# Patient Record
Sex: MALE | Race: BLACK OR AFRICAN AMERICAN | Employment: OTHER | URBAN - METROPOLITAN AREA
[De-identification: names, ages, dates, MRNs, and addresses within clinical notes are randomized per-mention and may not be internally consistent; named-entity substitution may affect disease eponyms.]

---

## 2021-01-01 ENCOUNTER — HOSPICE ADMISSION (OUTPATIENT)
Dept: HOSPICE | Facility: HOSPICE | Age: 76
End: 2021-01-01
Payer: MEDICARE

## 2021-01-01 ENCOUNTER — HOSPITAL ENCOUNTER (INPATIENT)
Age: 76
LOS: 2 days | End: 2021-09-03
Attending: INTERNAL MEDICINE | Admitting: INTERNAL MEDICINE

## 2021-01-01 VITALS
SYSTOLIC BLOOD PRESSURE: 120 MMHG | RESPIRATION RATE: 17 BRPM | TEMPERATURE: 97.4 F | HEART RATE: 50 BPM | DIASTOLIC BLOOD PRESSURE: 70 MMHG

## 2021-01-01 PROCEDURE — 74011250636 HC RX REV CODE- 250/636: Performed by: INTERNAL MEDICINE

## 2021-01-01 PROCEDURE — 0656 HSPC GENERAL INPATIENT

## 2021-01-01 PROCEDURE — 3336500001 HSPC ELECTION

## 2021-01-01 PROCEDURE — 74011250637 HC RX REV CODE- 250/637: Performed by: INTERNAL MEDICINE

## 2021-01-01 PROCEDURE — 74011000250 HC RX REV CODE- 250: Performed by: NURSE PRACTITIONER

## 2021-01-01 PROCEDURE — 74011250636 HC RX REV CODE- 250/636: Performed by: NURSE PRACTITIONER

## 2021-01-01 PROCEDURE — G0299 HHS/HOSPICE OF RN EA 15 MIN: HCPCS

## 2021-01-01 RX ORDER — ACETAMINOPHEN 650 MG/1
650 SUPPOSITORY RECTAL
Status: DISCONTINUED | OUTPATIENT
Start: 2021-01-01 | End: 2021-01-01 | Stop reason: HOSPADM

## 2021-01-01 RX ORDER — SODIUM CHLORIDE 0.9 % (FLUSH) 0.9 %
3 SYRINGE (ML) INJECTION AS NEEDED
Status: DISCONTINUED | OUTPATIENT
Start: 2021-01-01 | End: 2021-01-01 | Stop reason: HOSPADM

## 2021-01-01 RX ORDER — HYDROMORPHONE HYDROCHLORIDE 1 MG/ML
0.5 INJECTION, SOLUTION INTRAMUSCULAR; INTRAVENOUS; SUBCUTANEOUS
Status: DISCONTINUED | OUTPATIENT
Start: 2021-01-01 | End: 2021-01-01 | Stop reason: HOSPADM

## 2021-01-01 RX ORDER — FENTANYL 25 UG/1
1 PATCH TRANSDERMAL
Status: DISCONTINUED | OUTPATIENT
Start: 2021-01-01 | End: 2021-01-01 | Stop reason: HOSPADM

## 2021-01-01 RX ORDER — HALOPERIDOL 5 MG/ML
2 INJECTION INTRAMUSCULAR
Status: DISCONTINUED | OUTPATIENT
Start: 2021-01-01 | End: 2021-01-01 | Stop reason: HOSPADM

## 2021-01-01 RX ORDER — DEXAMETHASONE SODIUM PHOSPHATE 4 MG/ML
2 INJECTION, SOLUTION INTRA-ARTICULAR; INTRALESIONAL; INTRAMUSCULAR; INTRAVENOUS; SOFT TISSUE EVERY 12 HOURS
Status: DISCONTINUED | OUTPATIENT
Start: 2021-01-01 | End: 2021-01-01 | Stop reason: HOSPADM

## 2021-01-01 RX ORDER — SODIUM CHLORIDE 0.9 % (FLUSH) 0.9 %
3 SYRINGE (ML) INJECTION EVERY 12 HOURS
Status: DISCONTINUED | OUTPATIENT
Start: 2021-01-01 | End: 2021-01-01 | Stop reason: HOSPADM

## 2021-01-01 RX ORDER — GLYCOPYRROLATE 0.2 MG/ML
0.2 INJECTION INTRAMUSCULAR; INTRAVENOUS
Status: DISCONTINUED | OUTPATIENT
Start: 2021-01-01 | End: 2021-01-01 | Stop reason: HOSPADM

## 2021-01-01 RX ADMIN — HALOPERIDOL LACTATE 2 MG: 5 INJECTION, SOLUTION INTRAMUSCULAR at 00:44

## 2021-01-01 RX ADMIN — HYDROMORPHONE HYDROCHLORIDE 0.5 MG: 1 INJECTION, SOLUTION INTRAMUSCULAR; INTRAVENOUS; SUBCUTANEOUS at 12:23

## 2021-01-01 RX ADMIN — HALOPERIDOL LACTATE 2 MG: 5 INJECTION, SOLUTION INTRAMUSCULAR at 19:43

## 2021-01-01 RX ADMIN — HYDROMORPHONE HYDROCHLORIDE 0.5 MG: 1 INJECTION, SOLUTION INTRAMUSCULAR; INTRAVENOUS; SUBCUTANEOUS at 10:08

## 2021-01-01 RX ADMIN — HYDROMORPHONE HYDROCHLORIDE 0.5 MG: 1 INJECTION, SOLUTION INTRAMUSCULAR; INTRAVENOUS; SUBCUTANEOUS at 02:33

## 2021-01-01 RX ADMIN — HALOPERIDOL LACTATE 2 MG: 5 INJECTION, SOLUTION INTRAMUSCULAR at 19:10

## 2021-01-01 RX ADMIN — HALOPERIDOL LACTATE 2 MG: 5 INJECTION, SOLUTION INTRAMUSCULAR at 10:08

## 2021-01-01 RX ADMIN — Medication 3 ML: at 09:31

## 2021-01-01 RX ADMIN — SODIUM CHLORIDE, PRESERVATIVE FREE 3 ML: 5 INJECTION INTRAVENOUS at 19:11

## 2021-01-01 RX ADMIN — HYDROMORPHONE HYDROCHLORIDE 0.5 MG: 1 INJECTION, SOLUTION INTRAMUSCULAR; INTRAVENOUS; SUBCUTANEOUS at 01:06

## 2021-01-01 RX ADMIN — DEXAMETHASONE SODIUM PHOSPHATE 2 MG: 4 INJECTION, SOLUTION INTRAMUSCULAR; INTRAVENOUS at 20:09

## 2021-01-01 RX ADMIN — HYDROMORPHONE HYDROCHLORIDE 0.5 MG: 1 INJECTION, SOLUTION INTRAMUSCULAR; INTRAVENOUS; SUBCUTANEOUS at 19:43

## 2021-01-01 RX ADMIN — Medication 3 ML: at 10:09

## 2021-01-01 RX ADMIN — HYDROMORPHONE HYDROCHLORIDE 0.5 MG: 1 INJECTION, SOLUTION INTRAMUSCULAR; INTRAVENOUS; SUBCUTANEOUS at 00:45

## 2021-01-01 RX ADMIN — DEXAMETHASONE SODIUM PHOSPHATE 2 MG: 4 INJECTION, SOLUTION INTRAMUSCULAR; INTRAVENOUS at 10:09

## 2021-01-01 RX ADMIN — HALOPERIDOL LACTATE 2 MG: 5 INJECTION, SOLUTION INTRAMUSCULAR at 15:46

## 2021-01-01 RX ADMIN — HALOPERIDOL LACTATE 2 MG: 5 INJECTION, SOLUTION INTRAMUSCULAR at 12:24

## 2021-01-01 RX ADMIN — HALOPERIDOL LACTATE 2 MG: 5 INJECTION, SOLUTION INTRAMUSCULAR at 02:33

## 2021-01-01 RX ADMIN — HYDROMORPHONE HYDROCHLORIDE 0.5 MG: 1 INJECTION, SOLUTION INTRAMUSCULAR; INTRAVENOUS; SUBCUTANEOUS at 15:46

## 2021-01-01 RX ADMIN — SODIUM CHLORIDE, PRESERVATIVE FREE 3 ML: 5 INJECTION INTRAVENOUS at 15:47

## 2021-01-01 RX ADMIN — Medication 3 ML: at 21:00

## 2021-01-01 RX ADMIN — Medication 3 ML: at 20:09

## 2021-01-01 RX ADMIN — SODIUM CHLORIDE, PRESERVATIVE FREE 3 ML: 5 INJECTION INTRAVENOUS at 12:26

## 2021-01-01 RX ADMIN — HYDROMORPHONE HYDROCHLORIDE 0.5 MG: 1 INJECTION, SOLUTION INTRAMUSCULAR; INTRAVENOUS; SUBCUTANEOUS at 19:10

## 2021-01-01 RX ADMIN — HYDROMORPHONE HYDROCHLORIDE 0.5 MG: 1 INJECTION, SOLUTION INTRAMUSCULAR; INTRAVENOUS; SUBCUTANEOUS at 23:07

## 2021-09-01 PROBLEM — N18.6 ESRD (END STAGE RENAL DISEASE) (HCC): Status: ACTIVE | Noted: 2021-01-01

## 2021-09-01 PROBLEM — Z51.5 HOSPICE CARE PATIENT: Status: ACTIVE | Noted: 2021-01-01

## 2021-09-01 NOTE — PROGRESS NOTES
Pt sitting on side of bed trying to get up. Asked pt what he needed, pt asking for a cookie. With two assist, assisted back to bed. Administered dilaudid and haldol IVP for pain and agitation.      Report given to Jane Nugent RN

## 2021-09-01 NOTE — PROGRESS NOTES
Bedside report received from off-going RN visual identification made, assumed care of pt. Pt resting quietly with eyes closed, no agitation or restlessness, no grimacing or groaning. Pt respirations unlabored. Tab alert in place, rails up x 2, bed in lowest position, safety maintained. FLACC 0. Pt is at GIP level of care, no change to pt discharge plan. Pt to stay at [facility] until passing. Plan of care reviewed with CNA. 1910- PRN haldol and Dilaudid given by off going RN. 1955- Patient resting quietly in bed with eyes clsoed. Respirartions unlabored with no signs or symptoms of distress, pain, agitation, or discomfort noted. 2250-Pt resting comfortably in bed with eyes closed; no signs of pain or distress noted. RR non labored. No agitation, NVD, or SOB. FLACC 0/10.    0044- Pt trying to get out of bed pulling on brief, taking of gown. PRN Haldol and dilaudid given. 0130-Patient resting quietly in bed with eyes clsoed. Respirartions unlabored with no signs or symptoms of distress, pain, agitation, or discomfort noted. 0321- Pt watching T.V. no signs of pain or distress noted. RR non labored. No agitation, NVD, or SOB. FLACC 0/10.    0529- Pt resting comfortably in bed with eyes closed; no signs of pain or distress noted. RR non labored. No agitation, NVD, or SOB. FLACC 0/10.

## 2021-09-01 NOTE — PROGRESS NOTES
Patient is 77 yr/old male admitted under [facility] level of care for management of pain, dyspnea, agitation, and wound care. Primary diagnosis is ESRD; associated diagnosis include metabolic encephalopathy, hypoglycemia, GI bleed, hypotension, HD dependence with right arm fistula, DM, debility, agitation, and stage 2 sacral wound, protein calorie malnutrition with BMI 17. Other medical history includes prostate cancer remote history, COPD, L3/L4 compression fracture, and non-ischemic dilated cardiomyopathy. Patient does have signed DNR. On arrival patient appears lethargic with eyes rolling backward at times. Does not respond to noxious stimuli. Allows full assessment and wound care. Also allows oral care. Right fistula noted; IVs in left arm. No signs of dyspnea, with RR 15, shallow. FLACC 0. Resting still with no signs of agitation. Stage 2 wound noted to sacrum with shearing skin; wound measuring 4cm X 2.5cm. Barrier cream applied. With large soft BM; no obvious signs of blood in stool. Appears unable to take PO safely. Hospital reports poor to no intake and refusal of PO medications. Patient has recent history of refusing medications and refusing scheduled dialysis sessions. During hospital stay, patient became unable to tolerate HD sessions becoming hypotensive. Also had difficulty with hypoglycemia. Decision was made by family to pursue hospice and comfort directed care. Family support consists of a sister and also a daughter who are working together to make decisions for patient. Admission complete and initial general hospice care plan which includes Spiritual, Psychosocial, and Bereavement. Immediate needs recognized and patient does not show any signs of unmanaged symptoms at this time. IDG team made aware of plan of care and immediate needs.

## 2021-09-02 NOTE — PROGRESS NOTES
assisted JOSE Thapa and Callie Hobbs CNA as they were attending to patient. Mr. Brady Never was quite agitated.  provided a calming presence by speaking softly and singing to patient while Andree Martin and Callie Hobbs were providing physical care. He settled and appeared to go to sleep.

## 2021-09-02 NOTE — PROGRESS NOTES
Problem: Emotional Support Needs  Goal: Patient/family is receiving emotional support  Description: Pt, Willis Shook, and family will receive emotional support weekly from SW through validation of feelings, weekly check-ins, education on the hospice philosophy, and rapport building throughout pt's hospice journey.    Outcome: Progressing Towards Goal

## 2021-09-02 NOTE — H&P
History and physical    History of present illness:Blas Young is a 78-year-old man with a principal hospice diagnosis of delirium with agitation for which there are multiple identified contributing diagnoses. [place] has necessitated physical restraint during dialysis and has progressed into deeper obtundation over the course of his most recent hospitalization 8/8/2021 -- 9/1/2021.   [place] has lost decisional capacity, and his daughter Sara Goodman (acting in agreement with the patient's Sister Arya Patricia and the patient's girlfriend Comfort Valle) has in consultation with palliative medicine division at [facility] chosen to stop dialysis and other life extending efforts including dextrose infusion to forestall recurrent idiopathic hypoglycemia. Hypoalbuminemia, cachexia (BMI 18), anemia, azotemia, hypotension, fever with caused undetermined during hospitalization, low cardiac output, diastolic dysfunction associated CHF are the most obvious contributors to his hospice diagnosis of delirium. Past medical history: Severe osteoporosis, peripheral vascular disease, previously treated adenocarcinoma of the prostate with PSA now at 9.4, elevated D-dimer, COPD, respiratory failure with hypoxia, pleural effusions, and reticulonodular infiltrate of uncertain significance represent another set of diagnoses which worsen his overall prognoses but which are not clearly associated with his metabolic encephalopathy. The patient's daughter has countermanded the patient's own directive for full resuscitation and now wants no resuscitative efforts in care limited to comfort measures.  Under the circumstances this long suffering septuagenarian's life expectancy is less than 2 weeks. Social history: Treatment team at [facility] felt that:recurrent admissions for missed HD sessions indicated that he may no longer be able to live independently or without a more supportive environment.   Decline in mental state prior to this hospitalization is not otherwise documented. He is a [place]. Posttraumatic stress disorder is a listed diagnosis. He quit smoking 8 years ago after accumulating 45 pack years. Family history: Noncontributory    Review of systems: Patient is nonresponsive to my simple inquiries but had earlier been in an agitated state demanding to get up. Physical examination: Cachectic -American man who is awake with eyes open and tracking movement in his visual fields but is not spontaneously speaking. He does groan with passive flexion of his right hip. The right foot is significantly colder than the left and dorsalis pedis pulses are absent bilaterally. The patient's right arm AV shunt has a palpable thrill and audible bruit. Lung fields are marked by coarse basilar congestion. He has a loud 2/6 systolic ejection murmur which radiates into his abdominal aorta. There is no palpable organomegaly or increased aortic diameter. Heart rate is 50 bpm respirations 17 and unlabored blood pressure 120/70. Impression: The patient has ceased to eat or ingest fluid or medication in the past week.  Severe pain from recent lumbar compression fractures and 2020 hip fracture requiring surgical repair continues to demand parenteral opioid for patient comfort. Over his first 24 hours in care at [facility] his hydromorphone requirement has been roughly 1 mg IV every 12 hours equivalent to fentanyl patch 25 MCG per hour this will be placed today and we will continue observing the patient's level of comfort.   Jennifer Holley is also needing parenteral psychotropic medication for management of delirium with agitation. We will check fingerstick glucose on an as needed basis for diaphoresis and tachycardia suggesting recurrent hypoglycemia. Patient has had no insulin in over a week.   Unexplained bradycardia dysrhythmia raises the possibility of occult adrenal insufficiency, thus I will start scheduled glucocorticoid and attempt to head off recurrent hypoglycemia.     The patient's daughter has countermanded the patient's own directive for full resuscitation and now wants no resuscitative efforts in care limited to comfort measures.  Under the circumstances this long suffering septuagenarian's life expectancy is less than 2 weeks.

## 2021-09-02 NOTE — PROGRESS NOTES
Demographics      Information provided by: Pt was sleeping when SW entered the room. Pt's sister Carlito Crews and pt's daughter Gaby Richardson, via phone. Name:  Micha Bedoya                                                                  Level of Care  GIP [x] Routine  [] Respite   []           From the in home program Yes [] No [x]                                                        Diagnosis: End Stage Renal Disease         Insurance    Medicare [x]   Medicaid  []  Blue Cross  []      Other []                Social    []   Single [x]     []    []     Children: One daughter reported. Community Resources Used in the Home prior to admission: Yes []  No [x]    [employer] Needed? Yes []  No [x]    If yes, explain:         Financial Concerns: No reported financial concerns at this time. Gaby Richardson reported having been estranged from pt for most of her life and that she is taking care of what needs to be done. SW will continue to assess for needs and concerns around finances. Betty Application Needed   Yes []  No [x]     Medicaid application needed Yes []  No [x]                                    IFA Form Complete  Yes [x]   No []     Discharge Plans: Plans are to remain [facility] for GIP and comfort care. Work History :  Retired [x] [employer] [] Part-time [] Disabled []      [address]   Yes [x]  No []  Branch   [employer] []   [employer] [] [employer] [] [facility] []  [employer] []  [employer] []  Linked to [place]   Yes []  No []  Referral made to [facility] Yes [] No [x]   **Pt's branch of  is unknown at this time. Advanced Directives Scanned in the system      Living Will  Yes  []  No [x]   HCPOA     Yes  []  No [x]   DPOA        Yes  []  No [x]  DNR           Yes [x]  No []         Spiritual / Mahogany Jaegers Support: Pt's Roman Catholic support is unknown at this time.  Gaby Richardson reports that she is a follower of alexis.          Final Arrangements: The family will possibly be using [facility]  home in [facility] but Anushka Purdy and Nicole Scott are unsure. SW will leave a list of  homes in pt's room for the family in case they are in need of it. Medical History and/or Narrative copied from the patients chart from the [address] [place] [address] Physician or Rn:  [facility] Nurse Notes-  Patient is 77 yr/old male admitted under [facility] level of care for management of pain, dyspnea, agitation, and wound care. Primary diagnosis is ESRD; associated diagnosis include metabolic encephalopathy, hypoglycemia, GI bleed, hypotension, HD dependence with right arm fistula, DM, debility, agitation, and stage 2 sacral wound, protein calorie malnutrition with BMI 17. Other medical history includes prostate cancer remote history, COPD, L3/L4 compression fracture, and non-ischemic dilated cardiomyopathy. Patient does have signed DNR. Mental Health History: There is no reported mental health history for pt. Volunteer discussion: Yes []    No [x]  **Due to COVID-19 protocols. Goals of care for the patient and family: To keep pt comfortable and to meet pt and family needs. Coping and Bereavement: Nicole Scott is coping well. She reports providing more support for her [employer] who has always been close to pt. SW will continue to assess for coping and bereavement needs. Anushka Purdy reports no needs at this time except trying to find pt's paperwork such as his will and discharge paperwork from the [employer]. Was a Referral made to Bereavement  Yes [] No  [x]    Support Needs: Nicole Scott reports that her mother was at [facility] years ago so she is aware of the hospice philosophy and the process. SW offered emotional support to [place]. SW will continue to assess for needs and offer/provide emotional support weekly.

## 2021-09-02 NOTE — PROGRESS NOTES
Problem: General Wound Care  Goal: *Non-infected wound: Improvement of existing wound, absence of infection, and maintenance of skin integrity  Outcome: Progressing Towards Goal  Note: Stage 2 wound 4 cm X 2.5 cm   Apply zinc cream as ordered  Goal: Interventions  Outcome: Progressing Towards Goal     Problem: Breathing Pattern - Ineffective  Goal: *PALLIATIVE CARE:  Alleviation of Dyspnea  Outcome: Progressing Towards Goal  Note: Pt respirations would be less then 24/minute and unlabored  Oxygen on at 2 liters per nasal cannula prn  Dilaudid 0.5 mg IV q 30 minutes prn

## 2021-09-02 NOTE — PROGRESS NOTES
Report received from [address] [place] [address] RN,  visual identification made, assumed care of pt. Pt resting quietly with eyes closed, no agitation or restlessness, no grimacing or groaning. Pt respirations unlabored. Tab alert in place, rails up x 2, bed in lowest position, safety maintained. FLACC 0. Pt on alternating air mattress. Discharge plan is for pt to remain at [facility] under [facility] level of care until demise. Will continue to evaluate pt discharge plan for potential changes. 0900 pt resting quietly    0955 with two assist turned and repositioned pt, did incontinent care for brown soft stool, applied zinc paste and moisture barrier cream to sacrum. Flushed IV. Pt asking for coffee. And states he's cold. Covered him with warm blanket, physical assessment completed. Lung sounds clear but diminished, heart sounds distant and irregular, pt answers simple yes and no questions, abdomen soft with hypoactive bowel sounds. Pt had no urine output when brief changed. Extremities cool with palpable pulses to upper extremities and non palpable to lower extremities. After assessment completed. Pt was too drowsy to drink coffee. 1122 pt resting quietly, no grimacing, or groaning. 1215 pt sitting up in bed, agitated, right leg out of bed, when returned leg to bed, pt grimaced and said that hurt. Pt restless. 1229 administered dilaudid and haldol IVP for pain and agitation. Pt continues to be agitated, thrashing around. Asked pt if there was something he needed, he would not reply. Pt refused food and drink, closing his mouth tightly. 1300 pt resting quietly, no grimacing, groaning or agitation. FLACC 0/10    1554 with two assist checked pt and repositioned pt on his right side, pt began coughing and spitting, spit up secretions on floor. Attempted to suction pt's mouth with Víctor pt shaking head back and forth, attempted to straighten and position pt. Pt began to hit and thrash around.  Called  Jagjit José, she sang to pt and he stopped hitting and thrashing. Administered haldol and morphine for pain and agitation. Pt settled at this time    1700 pt resting quietly, no agitation, restlessness or grimacing, FLACC 0/10    1806 pt continues resting quietly.

## 2021-09-02 NOTE — PROGRESS NOTES
Problem: Falls - Risk of  Goal: *Absence of Falls  Description: Document Sukhwinder Dixon Fall Risk and appropriate interventions in the flowsheet. Outcome: Progressing Towards Goal  Note: Fall Risk Interventions:       Mentation Interventions: Bed/chair exit alarm, Family/sitter at bedside, More frequent rounding    Medication Interventions: Bed/chair exit alarm    Elimination Interventions: Bed/chair exit alarm, Call light in reach              Problem: Hospice Orientation  Goal: Demonstrate understanding of hospice philosophy, plan of care, and home hospice program  Description: The patient/family/caregiver will demonstrate understanding of hospice philosophy, plan of care and the home hospice program as evidenced by participation in meeting the patient's psychosocial, spiritual, medical, and physical needs inclusive of medical supplies/equipment focusing on symptoms.   Outcome: Progressing Towards Goal

## 2021-09-02 NOTE — HSP[facility] IDG CHAPLAIN NOTES
Patient: Josephine Chaves    Date: 09/02/21  Time: 2:15 PM    [facility]    / Grief Counselor has reviewed  Initial Comprehensive Assessment and plan of care  in the Nurses notes. Bereavement and Spiritual Care Assessments to be completed and plan of care put in place to meet the needs, requests and referrals.   Notes          Signed by: Loco Franklin

## 2021-09-02 NOTE — HSP[facility] IDG SOCIAL WORKER NOTES
Patient: Oksana Hernandez    Date: 09/02/21  Time: 9:53 AM    \[facility] [place] [facility]\""  Notes  SW has read the initial comprehensive assessment and plan of care. No immediate needs noted. Initial SW assessment visit will be completed within 5 days of admission.          Signed by: Avis Díaz LMSW

## 2021-09-03 NOTE — PROGRESS NOTES
Death Visit       met family outside following Mr. Mckay Deepak death. There was a large group of family members.  recognized Mr. Nahomy Trotter. She was walking with a cane.  took her a wheel chair and one of the family members offered to navigate the chair when they were ready to come in. After the family settled in patient's room,  went in and offered words of comfort  And prayer. Family grieving appropriately. Discussed the availability of Bereavement Care. Family expressed appreciation for support.

## 2021-09-03 NOTE — HSPC IDG CHAPLAIN NOTES
Patient: Roderic Goodell    Date: 09/03/21  Time: 11:42 AM    [facility]  Notes     assisted staff by providing a calming presence an singing with patient in order to calm him yesterday.          Signed by: Rudy Jacob

## 2021-09-03 NOTE — PROGRESS NOTES
7426- Patient report taken from [facility] and Walking rounds completed. Patient identified by name and . Patient is GIP with diagnosis of ESRD. We are treating symptoms of pain, agitation and SOB. Patient is now resting quietly in the bed with no signs of distress observed. Pain is at 0/10 using non verbal scale. No signs of agitation, SOB, nausea / vomit observed. The bed is low and locked with two bed rails up and tab alert in place. The door to the patient's rom is left open for close observations at the patient is alone. 1005- Patient is moaning and grimacing and is restless. Medicated with Haldol 2 mg IV for agitation/anxiety and with Hydromorphone . 5 mg IV for pain of 6/10 scale. Scheduled Decadron administered and line flushed with normal saline. 1030- Patient is now resting quietly in the bed with no signs of distress. Pain is at 0/10 using non verbal scale. No signs of anxiety / agitation or SOB. 1115- Patient was being bathed and met his demise. No obtainable vital signs at 11:14.

## 2021-09-03 NOTE — PROGRESS NOTES
Problem: Falls - Risk of  Goal: *Absence of Falls  Description: Document [place] Castle Fall Risk and appropriate interventions in the flowsheet. Outcome: Progressing Towards Goal  Note: Fall Risk Interventions:       Mentation Interventions: Bed/chair exit alarm, Door open when patient unattended    Medication Interventions: Bed/chair exit alarm    Elimination Interventions: Bed/chair exit alarm              Problem: Pain  Goal: *Control of acute pain  Outcome: Progressing Towards Goal     Problem: Anxiety/Agitation  Goal: Verbalize or staff assess the ability to manage anxiety  Description: The patient/family/caregiver will verbalize and demonstrate ability to manage the patient's anxiety throughout hospice care.   Outcome: Progressing Towards Goal

## 2021-09-03 NOTE — HSPC IDG NURSE NOTES
Patient: Melony Coppola    Date: 09/03/21  Time: 11:41 AM    [facility] Nurse Notes  1st IDG: Pt is a 80-year-old Male with ESRD who is here GIP level of care for management of agitation. Patient is anuric   IV access: PIV x 2 L arm   PO intake: NPO  Oxygen: 2/L PRN  Wounds: Sacrum Stage II  PRN medications: Dilaudid 0.5mg x 6 doses for pain / Haldol 2mg x 4 doses for agitation   Scheduled meds:  Decadron 2mg Q 12 hours / Fentanyl 25mcg patch Q 72 hours  Plan:  Comprehensive plan of care reviewed. IDG and pt./family in agreement with plan of care. The IDG identifies through on-going assessment when a change is needed to the POC; the pt/family will receive care and services necessitated by changes in POC. Medications reviewed by the pharmacist and Medical Director.         Signed by: Zoraida Ye RN

## 2022-03-22 NOTE — [facility] IDG MASTER NOTE
[facility]  Date: 09/03/21  Time: 11:43 AM    ___________________    Patient: Roderic Goodell  Coverage Information:     Payor: [place] MEDICARE     Plan: [facility] MEDICARE PART A AND B     Subscriber ID: [id number]     Phone Number:   MRN: [id number]  Current Benefit Period: Benefit Period 1  Start Date: 9/1/2021  End Date: 11/29/2021        Hospice Attending Provider: Lukasz Viera [address] [place] [address]  [id number]  Phone: [phone]  Fax: [phone]    Level of Care: General Inpatient Care      ___________________    Diagnoses: There were no encounter diagnoses.     Current Medications:    Current Facility-Administered Medications:     fentaNYL (DURAGESIC) 25 mcg/hr patch 1 Patch, 1 Patch, TransDERmal, Q72H, Lukasz Viear MD, 1 Patch at 09/02/21 1942    dexamethasone (DECADRON) 4 mg/mL injection 2 mg, 2 mg, IntraVENous, Q12H, Lukasz Viera MD, 2 mg at 09/03/21 1009    sodium chloride (NS) flush 3 mL, 3 mL, IntraVENous, Q12H, Bao Schneider NP, 3 mL at 09/03/21 1009    sodium chloride (NS) flush 3 mL, 3 mL, IntraVENous, PRN, Bao Schneider NP, 3 mL at 09/02/21 1547    haloperidol lactate (HALDOL) injection 2 mg, 2 mg, SubCUTAneous, Q1H PRN **OR** haloperidol lactate (HALDOL) injection 2 mg, 2 mg, IntraVENous, Q1H PRN, Bao Schneider NP    acetaminophen (TYLENOL) suppository 650 mg, 650 mg, Rectal, Q3H PRN, Bao Schneider NP    haloperidol lactate (HALDOL) injection 2 mg, 2 mg, SubCUTAneous, Q1H PRN **OR** haloperidol lactate (HALDOL) injection 2 mg, 2 mg, IntraVENous, Q1H PRN, Bao Schneider NP, 2 mg at 09/03/21 1008    glycopyrrolate (ROBINUL) injection 0.2 mg, 0.2 mg, IntraVENous, Q4H PRN, Bao Schneider NP    HYDROmorphone (DILAUDID) syringe 0.5 mg, 0.5 mg, SubCUTAneous, Q30MIN PRN **OR** HYDROmorphone (DILAUDID) syringe 0.5 mg, 0.5 mg, IntraVENous, Q30MIN PRN, Bao Schneider NP, 0.5 mg at 09/03/21 1008    Orders:  Orders Placed This Encounter    IP CONSULT TO SPIRITUAL CARE Once on week one, then PRN. For [facility] patients only. For contracted patients, primary hospice will continue to manage spiritual care needs     Once on week one, then PRN. For [facility] patients only. For contracted patients, primary hospice will continue to manage spiritual care needs     Standing Status:   Standing     Number of Occurrences:   1     Order Specific Question:   Reason for Consult: Answer:   Spiritual crisis intervention or per patient or caregiver request    DIET PLEASURE     Standing Status:   Standing     Number of Occurrences:   1    VITAL SIGNS     Standing Status:   Standing     Number of Occurrences:   1    VITAL SIGNS     Standing Status:   Standing     Number of Occurrences:   1    NURSING-MISCELLANEOUS: comfort measures Enter comfort measures above. CONTINUOUS     Enter comfort measures above. Standing Status:   Standing     Number of Occurrences:   1     Order Specific Question:   Description of Order:     Answer:   comfort measures    BLADDER CHECKS     May scan bladder PRN for urinary retention and or patient discomfort     Standing Status:   Standing     Number of Occurrences:   1    PAIN ASSESSMENT Pain and Symptoms: Assess ever 4 hours and PRN, for GIP level of care. PRN Routine     Standing Status:   Standing     Number of Occurrences:   1     Order Specific Question:   Please describe the test or procedure you would like to order. Answer:   Pain and Symptoms: Assess ever 4 hours and PRN, for GIP level of care.  BEDREST, COMPLETE     Standing Status:   Standing     Number of Occurrences:   1    NURSING-MISCELLANEOUS: DME: Please order and place air mattress for pressure reduction. CONTINUOUS     Please order and place air mattress for pressure reduction.      Standing Status:   Standing     Number of Occurrences:   1     Order Specific Question:   Description of Order:     Answer:   DME:    Yes NURSING-MISCELLANEOUS: Omit Bowel Regime Omit Bowel Regime: OMIT BOWEL REGIME: Medically Contraindicated and GI bleed  CONTINUOUS     Omit Bowel Regime: OMIT BOWEL REGIME: Medically Contraindicated and GI bleed     Standing Status:   Standing     Number of Occurrences:   1     Order Specific Question:   Description of Order:     Answer:   Omit Bowel Regime    NURSING-MISCELLANEOUS: admit 9/1: (Tonya) Admitted GIP with ESRD for management of pain, dyspnea, agitation and wound care. Hospice Dx: ESRD Associated Dx:  metabolic encephalopathy, hypoglycemia, JOELLE, GI bleed, hypotension, HD dependence, L arm . .. 9/1: (Tonya) Admitted GIP with ESRD for management of pain, dyspnea, agitation and wound care. Hospice Dx: ESRD    Associated Dx:  metabolic encephalopathy, hypoglycemia, JOELLE, GI bleed, hypotension, HD dependence, L arm AV fistula, diabetes, debility, agitation, ad sacral wound. Non Associated Dx: remote hx of prostate cancer, COPD, L3,L4 compression fx, and non-ischemic dilated cardiomyopathy. Standing Status:   Standing     Number of Occurrences:   1     Order Specific Question:   Description of Order:     Answer:   admit    WOUND CARE, DRESSING CHANGE     Wound Care:  Location: sacrum  Decubitus Wound Stage II (Cavalon)- Cleanse wound location with wound cleanser, pat dry and apply Cavilon Durable Barrier Cream every 12 hours and PRN if soiled. Turn every 2 hours. Assess with each nursing assessment visit. Standing Status:   Standing     Number of Occurrences:   29    NURSING ASSESSMENT:  SPECIFY Assess for GIP, routine, or respite level of care. Mat Lepe is a 73-year-old man with a principal hospice diagnosis of delirium with agitation for which there are multiple identified contributing diagnoses.  Delirium. ..      Mat Lepe is a 73-year-old man with a principal hospice diagnosis of delirium with agitation for which there are multiple identified contributing diagnoses. Hattie Weaver has necessitated physical restraint during dialysis and has progressed into deeper obtundation over the course of his most recent hospitalization 8/8/2021 -- 9/1/2021. Willy Guardado has lost decisional capacity, and his daughter Verito Bashir (acting in agreement with the patient's Sister Asher Dan and the patient's girlfriend Debi Escobar) has in consultation with palliative medicine division at [facility] chosen to stop dialysis and other life extending efforts including dextrose infusion to forestall recurrent idiopathic hypoglycemia.  The patient has ceased to eat or ingest fluid or medication in the past week.  Severe pain from recent lumbar compression fractures and 2020 hip fracture requiring surgical repair continues to demand parenteral opioid for patient comfort. Willy Guardado is also needing parenteral psychotropic medication for management of delirium with agitation.  Hypoalbuminemia, cachexia (BMI 18), anemia, azotemia, hypotension, fever with caused undetermined during hospitalization, low cardiac output, diastolic dysfunction associated CHF are the most obvious contributors to his hospice diagnosis of delirium.  Severe osteoporosis, peripheral vascular disease, previously treated adenocarcinoma of the prostate with PSA now at 9.4, elevated D-dimer, COPD, respiratory failure with hypoxia, pleural effusions, and reticulonodular infiltrate of uncertain significance represent another set of diagnoses which worsen his overall prognoses but which are unassociated with his metabolic encephalopathy. The patient's daughter has countermanded the patient's own directive for full resuscitation and now wants no resuscitative efforts in care limited to comfort measures.  Under the circumstances this long suffering septuagenarian's life expectancy is less than 2 weeks. Standing Status:   Standing     Number of Occurrences:   1     Order Specific Question:   Please describe the test or procedure you would like to order.      Answer:   Assess for GIP, routine, or respite level of care.  DO NOT RESUSCITATE     Standing Status:   Standing     Number of Occurrences:   1    OXYGEN CANNULA Liters per minute: 2; Indications for O2 therapy: RESPIRATORY DISTRESS PRN Routine     Standing Status:   Standing     Number of Occurrences:   1     Order Specific Question:   Liters per minute: Answer:   2     Order Specific Question:   Indications for O2 therapy     Answer:   RESPIRATORY DISTRESS    sodium chloride (NS) flush 3 mL    sodium chloride (NS) flush 3 mL    OR Linked Order Group     haloperidol lactate (HALDOL) injection 2 mg     haloperidol lactate (HALDOL) injection 2 mg    acetaminophen (TYLENOL) suppository 650 mg    OR Linked Order Group     haloperidol lactate (HALDOL) injection 2 mg     haloperidol lactate (HALDOL) injection 2 mg    glycopyrrolate (ROBINUL) injection 0.2 mg    OR Linked Order Group     HYDROmorphone (DILAUDID) syringe 0.5 mg     HYDROmorphone (DILAUDID) syringe 0.5 mg    fentaNYL (DURAGESIC) 25 mcg/hr patch 1 Patch    dexamethasone (DECADRON) 4 mg/mL injection 2 mg    INITIAL PHYSICIAN ORDER: HOSPICE Level Of Care: General Inpatient; Reason for Admission: 9/1: (Tonya) Admitted GIP with ESRD for management of pain, dyspnea, agitation and wound care. Standing Status:   Standing     Number of Occurrences:   1     Order Specific Question:   Status     Answer:   Hospice     Order Specific Question:   Level Of Care     Answer:   General Inpatient     Order Specific Question:   Reason for Admission     Answer:   9/1: (Tonya) Admitted GIP with ESRD for management of pain, dyspnea, agitation and wound care. Order Specific Question:   Inpatient Hospitalization Certified Necessary for the Following Reasons     Answer:   3.  Patient receiving treatment that can only be provided in an inpatient setting (further clarification in H&P documentation)     Order Specific Question:   Admitting Diagnosis     Answer:   ESRD (end stage renal disease) [facility]) [[id number]]     Order Specific Question:   Terminal Prognosis Diagnosis(es)     Answer:   ESRD (end stage renal disease) ([facility] [[id number]]     Order Specific Question:   Admitting Physician     Answer:   Jimy Stewart     Order Specific Question:   Attending Physician     Answer:   Jimy Stewart     Order Specific Question:   Discharge Plan:     Answer: Other (Specify)    INITIAL PHYSICIAN ORDER: HOSPICE Level Of Care: General Inpatient; Reason for Admission: 9/1: (Tonya) Admitted GIP with ESRD for management of pain, dyspnea, agitation and wound care. Standing Status:   Standing     Number of Occurrences:   1     Order Specific Question:   Status     Answer:   Hospice     Order Specific Question:   Level Of Care     Answer:   General Inpatient     Order Specific Question:   Reason for Admission     Answer:   9/1: (Tonya) Admitted GIP with ESRD for management of pain, dyspnea, agitation and wound care. Order Specific Question:   Inpatient Hospitalization Certified Necessary for the Following Reasons     Answer:   3. Patient receiving treatment that can only be provided in an inpatient setting (further clarification in H&P documentation)     Order Specific Question:   Admitting Diagnosis     Answer:   ESRD (end stage renal disease) ([facility] [[id number]]     Order Specific Question:   Terminal Prognosis Diagnosis(es)     Answer:   [facility] [[id number]]     Order Specific Question:   Admitting Physician     Answer:   Jimy Stewart     Order Specific Question:   Attending Physician     Answer:   Jimy Stewart     Order Specific Question:   Discharge Plan:     Answer: Other (Specify)    IP CONSULT TO SOCIAL WORK     Once on week one, then PRN for Psychosocial crisis intervention or per patient or caregiver request.  For [facility] patients only. For contracted patients, primary hospice will continue to manage social work needs. Standing Status:   Standing     Number of Occurrences:   1     Order Specific Question:   Reason for Consult: Answer: Once on week one, then PRN for Psychosocial crisis intervention or per patient or caregiver request.       Allergies: Allergies   Allergen Reactions    Latex, Natural Rubber Rash    Aspirin Other (comments)     bleeding    Naproxen Sodium Nausea and Vomiting       Care Plan:  Encounter Problems (Active)     Problem: Anxiety/Agitation     Dates: Start: 09/01/21       Disciplines: Interdisciplinary    Goal: Verbalize or staff assess the ability to manage anxiety     Dates: Start: 09/01/21   Expected End: 09/04/21       Description: The patient/family/caregiver will verbalize and demonstrate ability to manage the patient's anxiety throughout hospice care. Disciplines: Interdisciplinary    Intervention: Assess for anxiety/agitation     Dates: Start: 09/01/21       Description: Assess for signs and symptoms of anxiety and agitation. Intervention: Instruct/Implement strategies to reduce anxiety/agitation     Dates: Start: 09/01/21       Description: Instruct patient/caregiver on strategies to reduce anxiety/agitation. Problem: Breathing Pattern - Ineffective     Dates: Start: 09/02/21       Disciplines: Nurse, Interdisciplinary, RT    Goal: *PALLIATIVE CARE:  Alleviation of Dyspnea     Dates: Start: 09/02/21   Expected End: 09/05/21       Disciplines: Nurse, Interdisciplinary, RT    Intervention: Refer patient for holistic services per facility     Dates: Start: 09/02/21                Problem: Communication Deficit     Dates: Start: 09/01/21       Disciplines: Interdisciplinary    Goal: Effectively communicate symptoms, needs, and concerns     Dates: Start: 09/01/21   Expected End: 09/04/21       Description: Patient/family/caregiver will effectively communicate symptoms, needs and concerns.     Disciplines: Interdisciplinary    Intervention: Assess for deficit in communication     Dates: Start: 09/01/21          Intervention: Instruct/Implement strategies to effectively communicate     Dates: Start: 09/01/21       Description: Instruct patient/caregiver on strategies to effectively communicate. Problem: Coping and Emotional Distress     Dates: Start: 09/01/21       Disciplines: Interdisciplinary    Goal: Demonstrate acceptance of terminal illness and understanding of disease progression     Dates: Start: 09/01/21   Expected End: 09/04/21       Description: Patient/family/caregiver will demonstrate acceptance of terminal disease and understanding of disease progression while employing appropriate coping mechanisms. Disciplines: Interdisciplinary    Intervention: Assess for alteration in coping     Dates: Start: 09/01/21          Intervention: Assess for signs/symptoms of emotional distress     Dates: Start: 09/01/21          Intervention: Instruct on effective coping skills     Dates: Start: 09/01/21       Description: Instruct patient/caregiver on effective coping skills. Intervention: Instruct on strategies to reduce emotional distress     Dates: Start: 09/01/21       Description: Instruct patient/caregiver on strategies to reduce emotional distress.              Problem: Discharge Planning     Dates: Start: 09/01/21       Disciplines: Interdisciplinary    Goal: *Participates in discharge planning     Dates: Start: 09/01/21   Expected End: 09/04/21       Disciplines: Interdisciplinary    Intervention: Healthcare knowledge assessment to include dying process, prognosis, treatment regimen, medications upon discharge     Dates: Start: 09/01/21          Intervention: Advanced care planning     Dates: Start: 09/01/21          Intervention: Identify support systems     Dates: Start: 09/01/21                Problem: Emotional Support Needs     Dates: Start: 09/02/21       Description: Pt, pt's sister Vernia Krabbe, pt's daughter River Galvan, and pt's family will have their emotional support needs met weekly by SW. Disciplines: Interdisciplinary    Goal: Patient/family is receiving emotional support     Dates: Start: 09/02/21   Expected End: 09/10/21       Description: Pt, Loc Tristan, and family will receive emotional support weekly from SW through validation of feelings, weekly check-ins, education on the hospice philosophy, and rapport building throughout pt's hospice journey. Disciplines: Interdisciplinary    Intervention: Assess for emotional distress     Dates: Start: 09/02/21       Description: SW will assess for emotional distress in pt, Tammye Leap, and family through the use of open ended questions, motivational interviewing, and observation and assessment of verbal/nonverbal cues. Intervention: Provide emotional support of the family's cultural expressions of grief and loss     Dates: Start: 09/02/21       Description: SW will provide emotional support of the family's cultural expression of grief, loss, and response to illness. Problem: End of Life Process     Dates: Start: 09/01/21       Disciplines: Interdisciplinary    Goal: Demonstrate understanding of end of life processes     Dates: Start: 09/01/21   Expected End: 09/04/21       Description: Tran Momin will understand end of life processes.     Disciplines: Interdisciplinary    Intervention: Assess for signs/symptoms of terminal restlessness     Dates: Start: 09/01/21          Intervention: Implement strategies to reduce terminal restlessness     Dates: Start: 09/01/21          Intervention: Instruct on the dying process     Dates: Start: 09/01/21          Intervention: Instruct: imminent death     Dates: Start: 09/01/21          Intervention: Instruct: process at end of life     Dates: Start: 09/01/21                Problem: Falls - Risk of     Dates: Start: 09/01/21       Disciplines: Interdisciplinary    Goal: *Absence of Falls     Dates: Start: 09/01/21   Expected End: 09/04/21 Description: Document Cassi Marley Fall Risk and appropriate interventions in the flowsheet.     Disciplines: Interdisciplinary          Problem: General Wound Care     Dates: Start: 09/01/21       Disciplines: Interdisciplinary    Goal: Interventions     Dates: Start: 09/01/21   Expected End: 09/04/21       Disciplines: Interdisciplinary    Intervention: Pain management     Dates: Start: 09/01/21          Intervention: Wound assessment, including wound bed description and dimensions     Dates: Start: 09/01/21          Intervention: Wound dressing change, sterile or clean     Dates: Start: 09/01/21          Intervention: Wound care (eg: Remove necrotic tissue; infection control/protection; absorb exudate; fill dead space; maintain moisture; maintain constant temperature of wound)     Dates: Start: 09/01/21          Intervention: Skin assessment     Dates: Start: 09/01/21          Intervention: Skin monitoring and care (e.g. skin cleansing; keep dry, moisturize, utilization of  lotion and/or skin barrier cream)     Dates: Start: 09/01/21          Intervention: Peripheral circulation assessment     Dates: Start: 09/01/21          Intervention: Incontinence management     Dates: Start: 09/01/21          Intervention: Wound-healing assessment     Dates: Start: 09/01/21                Problem: General Wound Care     Dates: Start: 09/02/21       Disciplines: Interdisciplinary    Goal: *Non-infected wound: Improvement of existing wound, absence of infection, and maintenance of skin integrity     Dates: Start: 09/02/21   Expected End: 09/05/21       Disciplines: Interdisciplinary       Goal: Interventions     Dates: Start: 09/02/21   Expected End: 09/04/21       Disciplines: Interdisciplinary    Intervention: Wound assessment, including wound bed description and dimensions     Dates: Start: 09/02/21                Problem: Hospice Orientation     Dates: Start: 09/01/21       Disciplines: Interdisciplinary    Goal: Demonstrate understanding of hospice philosophy, plan of care, and home hospice program     Dates: Start: 09/01/21   Expected End: 09/04/21       Description: The patient/family/caregiver will demonstrate understanding of hospice philosophy, plan of care and the home hospice program as evidenced by participation in meeting the patient's psychosocial, spiritual, medical, and physical needs inclusive of medical supplies/equipment focusing on symptoms. Disciplines: Interdisciplinary    Intervention: Instruct on hospice philosophy     Dates: Start: 09/01/21          Intervention: Instruct: hospice orientation     Dates: Start: 09/01/21          Intervention: Instruct: medical equipment     Dates: Start: 09/01/21       Description: Instruct patient/caregiver on medical equipment and supplies.        Intervention: Instruct: medical power of  and will     Dates: Start: 09/01/21          Intervention: Instruct:terminal illness     Dates: Start: 09/01/21                Problem: Nausea/Vomiting (Adult)     Dates: Start: 09/01/21       Disciplines: Interdisciplinary    Goal: *Absence of nausea/vomiting     Dates: Start: 09/01/21   Expected End: 09/04/21       Disciplines: Interdisciplinary    Intervention: Aspiration risk - signs and symptoms (eg: Ineffective cough; altered level of consciousness; impaired mobility; drooling; poor dentition; vomiting; slurred speech; prolonged supine)     Dates: Start: 09/01/21          Intervention: Nonpharmacologic nausea management (eg:  Consistent room temperature; deep breathing; distraction; ice chips; minimal moving; pain management)     Dates: Start: 09/01/21          Intervention: Administer antiemetics as needed     Dates: Start: 09/01/21                Problem: Pain     Dates: Start: 09/01/21       Disciplines: Interdisciplinary    Goal: *Control of acute pain     Dates: Start: 09/01/21   Expected End: 09/04/21       Disciplines: Interdisciplinary    Intervention: Assess pain characteristics (eg: Intensity scale; onset; location; quality; severity; duration; frequency; radiation)     Dates: Start: 09/01/21          Intervention: Assess pain management - barriers (eg: Past pain experiences)     Dates: Start: 09/01/21          Intervention: Identify pain expectations (eg: Patient's pain goal; somatic experiences; behavioral changes; affect)     Dates: Start: 09/01/21          Intervention: Identify pain medication concerns (eg: Cultural considerations; addiction concerns)     Dates: Start: 09/01/21          Intervention: Support system identification (eg: Caregiver; community resource; family; friends; Buddhist; support group)     Dates: Start: 09/01/21          Intervention: Monitor for change in patient condition (eg:  Vital signs changes; changes in level of consciousness; nausea; behavioral changes)     Dates: Start: 09/01/21          Intervention: Medication side-effect assessment     Dates: Start: 09/01/21          Intervention: Pain-relief response reassessment (eg: Frequency based on route of administration; effectiveness)     Dates: Start: 09/01/21                Problem: Patient Education: Go to Patient Education Activity     Dates: Start: 09/01/21       Disciplines: Interdisciplinary    Goal: Patient/Family Education     Dates: Start: 09/01/21   Expected End: 09/04/21       Disciplines: Interdisciplinary          Problem: Patient Education: Go to Patient Education Activity     Dates: Start: 09/01/21       Disciplines: Interdisciplinary    Goal: Patient/Family Education     Dates: Start: 09/01/21   Expected End: 09/04/21       Disciplines: Interdisciplinary          Problem: Pressure Injury - Risk of     Dates: Start: 09/01/21       Disciplines: Interdisciplinary    Goal: *Prevention of pressure injury     Dates: Start: 09/01/21   Expected End: 09/04/21       Description: Document Jackson Scale and appropriate interventions in the flowsheet.     Disciplines: Interdisciplinary Problem: Risk for Falls     Dates: Start: 09/01/21       Disciplines: Interdisciplinary    Goal: Free of falls during inpatient stay     Dates: Start: 09/01/21   Expected End: 09/04/21       Description: Patient will be free of falls during inpatient stay. Disciplines: Interdisciplinary    Intervention: Assess fall risk     Dates: Start: 09/01/21       Description: Complete fall risk assessment on admission, recertification, and as indicated on fall.        Intervention: Instruct on fall prevention     Dates: Start: 09/01/21       Description: Call for assistance with ambulation and transfers during inpatient stay            Care Plan Problems/Goals      Progressing Towards Goal (18)      *Prevention of pressure injury (Pressure Injury - Risk of)    Disciplines:  Interdisciplinary Expected end:  09/04/21        Outcome: Progressing Towards Goal By Artem Alexis on 09/03/21 0511            Patient/Family Education (Patient Education: Go to Patient Education Activity)    Disciplines:  Interdisciplinary Expected end:  09/04/21        Outcome: Progressing Towards Goal By Artem Alexis on 09/03/21 0511            *Absence of Falls (Falls - Risk of)    Disciplines:  Interdisciplinary Expected end:  09/04/21        Outcome: Progressing Towards Goal By Artem Alexis on 09/03/21 0511            Patient/Family Education (Patient Education: Go to Patient Education Activity)    Disciplines:  Interdisciplinary Expected end:  09/04/21        Outcome: Progressing Towards Goal By Artem Alexis on 09/03/21 0511            Demonstrate understanding of hospice philosophy, plan of care, and home hospice program (Hospice Orientation)    Disciplines:  Interdisciplinary Expected end:  09/04/21        Outcome: Progressing Towards Goal By Artem Alexis on 09/03/21 0511            Free of falls during inpatient stay (Risk for Falls)    Disciplines:  Interdisciplinary Expected end:  09/04/21        Outcome: Progressing Towards Goal By Paolo Otero on 09/03/21 2730            *Control of acute pain (Pain)    Disciplines:  Interdisciplinary Expected end:  09/04/21        Outcome: Progressing Towards Goal By Paolo Otero on 09/03/21 0511            Verbalize or staff assess the ability to manage anxiety (Anxiety/Agitation)    Disciplines:  Interdisciplinary Expected end:  09/04/21        Outcome: Progressing Towards Goal By Paolo Otero on 09/03/21 0511            Effectively communicate symptoms, needs, and concerns (Communication Deficit)    Disciplines:  Interdisciplinary Expected end:  09/04/21        Outcome: Progressing Towards Goal By Paolo Otero on 09/03/21 0511            Demonstrate acceptance of terminal illness and understanding of disease progression (Coping and Emotional Distress)    Disciplines:  Interdisciplinary Expected end:  09/04/21        Outcome: Progressing Towards Goal By Paolo Otero on 09/03/21 0511            Interventions (General Wound Care)    Disciplines:  Interdisciplinary Expected end:  09/04/21        Outcome: Progressing Towards Goal By Paolo Otero on 09/03/21 0511            *Absence of nausea/vomiting (Nausea/Vomiting (Adult))    Disciplines:  Interdisciplinary Expected end:  09/04/21        Outcome: Progressing Towards Goal By Paolo Otero on 09/03/21 0511            Demonstrate understanding of end of life processes (End of Life Process)    Disciplines:  Interdisciplinary Expected end:  09/04/21        Outcome: Progressing Towards Goal By Paolo Otero on 09/03/21 0511            *Participates in discharge planning (Discharge Planning)    Disciplines:  Interdisciplinary Expected end:  09/04/21        Outcome: Progressing Towards Goal By Paolo Otero on 09/03/21 0511            *Non-infected wound: Improvement of existing wound, absence of infection, and maintenance of skin integrity (General Wound Care)    Disciplines:  Interdisciplinary Expected end: 09/05/21        Outcome: Progressing Towards Goal By Neal Maldonado on 09/03/21 0511            Interventions (General Wound Care)    Disciplines:  Interdisciplinary Expected end:  09/04/21        Outcome: Progressing Towards Goal By Neal Maldonado on 09/03/21 0511            *PALLIATIVE CARE:  Alleviation of Dyspnea (Breathing Pattern - Ineffective)    Disciplines:  Nurse, Interdisciplinary, RT Expected end:  09/05/21        Outcome: Progressing Towards Goal By Neal Maldonado on 09/03/21 4521            Patient/family is receiving emotional support (Emotional Support Needs)    Disciplines:  Interdisciplinary Expected end:  09/10/21        Outcome: Progressing Towards Goal By Neal Maldonado on 09/03/21 0511                              ___________________    Care Team Notes          POC/IDG Notes      [facility] IDG  Notes by Nathanael Rosales at 09/03/21 1142  Version 1 of 1    Author: Nathanael Rosales Service: Spiritual Care Author Type: Pastoral Care    Filed: 09/03/21 1143 Date of Service: 09/03/21 1142 Status: Signed    : Nathanael Rosales (Pastoral Care)       Patient: Aldo Castaneda    Date: 09/03/21  Time: 11:42 AM    [facility]  Notes     assisted staff by providing a calming presence an singing with patient in order to calm him yesterday. Signed by: Taryn SHEPPARD [facility] IDG Nurse Notes by Tete Lo RN at 09/03/21 1141  Version 1 of 1    Author: Tete Lo RN Service: NURSING Author Type: Registered Nurse    Filed: 09/03/21 1141 Date of Service: 09/03/21 1141 Status: Signed    : Tete Lo RN (Registered Nurse)       Patient: Aldo Castaneda    Date: 09/03/21  Time: 11:41 AM    [facility] Nurse Notes  1st IDG: Pt is a 61-year-old Male with ESRD who is here GIP level of care for management of agitation.   Patient is anuric   IV access: PIV x 2 L arm   PO intake: NPO  Oxygen: 2/L PRN  Wounds: Sacrum Stage II  PRN medications: Dilaudid 0.5mg x 6 doses for pain / Haldol 2mg x 4 doses for agitation   Scheduled meds:  Decadron 2mg Q 12 hours / Fentanyl 25mcg patch Q 72 hours  Plan:  Comprehensive plan of care reviewed. IDG and pt./family in agreement with plan of care. The IDG identifies through on-going assessment when a change is needed to the POC; the pt/family will receive care and services necessitated by changes in POC. Medications reviewed by the pharmacist and Medical Director. Signed by: Taryn Hardy RN       [facility] IDG  Notes by Maxi Hurt LMSW at 09/03/21 1140  Version 1 of 1    Author: Maxi Hurt LMSW Service: Licensed Clinical  Author Type: Licensed Masters in Social Work    Filed: 09/03/21 1141 Date of Service: 09/03/21 1140 Status: Signed    : Maxi Hurt LMSW (Licensed Masters in Social Work)       Mr Nicolasa Gutierrez passed during [address] this am.       [address] Notes by Koko Stewart at 09/03/21 1132  Version 1 of 1    Author: Koko Stewart Service: Spiritual Care Author Type: Pastoral Care    Filed: 09/03/21 1140 Date of Service: 09/03/21 1132 Status: Signed    : Koko Stewart (Pastoral Care)       Patient: Heydi Villalobos    Date: 09/03/21  Time:    Delete        Signed by: Joss Leon       [facility] IDG  Notes by Koko Stewart at 09/02/21 1415  Version 1 of 1    Author: Koko Stewart Service: Spiritual Care Author Type: Pastoral Care    Filed: 09/02/21 1416 Date of Service: 09/02/21 1415 Status: Signed    : Koko Stewart (Pastoral Care)       Patient: Heydi Villalobos    Date: 09/02/21  Time: 2:15 PM    [facility]    / Grief Counselor has reviewed  Initial Comprehensive Assessment and plan of care  in the Nurses notes. Bereavement and Spiritual Care Assessments to be completed and plan of care put in place to meet the needs, requests and referrals.   Notes          Signed by: Joss Leon       [facility] IDG  Notes by Zhanna Garvin LMSW at 09/02/21 3210  Version 1 of 1    Author: Edgar Hanna LMSW Service: -- Author Type: Licensed Masters in Social Work    Filed: 09/02/21 0953 Date of Service: 09/02/21 0953 Status: Signed    : Edgar Hanna [address] (Licensed Masters in Social Work)       Patient: Gerhard Gamboa    Date: 09/02/21  Time: 9:53 AM    [facility]  Notes  SW has read the initial comprehensive assessment and plan of care. No immediate needs noted. Initial  assessment visit will be completed within 5 days of admission. Signed by: Shashank Ramirez LMSW                Care Team Present:   Team Members Present: Physician, Nurse, , , Other (Comment)  Physician Team Member: Dr. Anibal Jones  Nurse Team Member: Valeria Taylor  Social Work Team Member: Ban Hilton   Team Member: Gildardo Rae  Other Discipline Present (Name):  Shannan Husbands NP